# Patient Record
Sex: MALE | Race: WHITE | NOT HISPANIC OR LATINO | URBAN - METROPOLITAN AREA
[De-identification: names, ages, dates, MRNs, and addresses within clinical notes are randomized per-mention and may not be internally consistent; named-entity substitution may affect disease eponyms.]

---

## 2024-01-15 ENCOUNTER — LAB REQUISITION (OUTPATIENT)
Dept: LAB | Facility: HOSPITAL | Age: 84
End: 2024-01-15
Payer: MEDICARE

## 2024-01-16 LAB
LABORATORY COMMENT REPORT: NORMAL
PATH REPORT.GROSS SPEC: NORMAL
PATH REPORT.TOTAL CANCER: NORMAL

## 2024-12-16 ENCOUNTER — HOSPITAL ENCOUNTER (OUTPATIENT)
Dept: RADIOLOGY | Facility: EXTERNAL LOCATION | Age: 84
Discharge: HOME | End: 2024-12-16

## 2024-12-16 ENCOUNTER — APPOINTMENT (OUTPATIENT)
Dept: SURGERY | Facility: CLINIC | Age: 84
End: 2024-12-16
Payer: MEDICARE

## 2024-12-16 VITALS
SYSTOLIC BLOOD PRESSURE: 124 MMHG | TEMPERATURE: 97.3 F | OXYGEN SATURATION: 94 % | DIASTOLIC BLOOD PRESSURE: 62 MMHG | HEART RATE: 83 BPM

## 2024-12-16 DIAGNOSIS — R10.9 ABDOMINAL PAIN, UNSPECIFIED ABDOMINAL LOCATION: ICD-10-CM

## 2024-12-16 DIAGNOSIS — R10.9 ABDOMINAL PAIN, UNSPECIFIED ABDOMINAL LOCATION: Primary | ICD-10-CM

## 2024-12-16 DIAGNOSIS — R10.84 GENERALIZED ABDOMINAL PAIN: Primary | ICD-10-CM

## 2024-12-16 DIAGNOSIS — K92.2 GASTROINTESTINAL HEMORRHAGE, UNSPECIFIED GASTROINTESTINAL HEMORRHAGE TYPE: ICD-10-CM

## 2024-12-16 PROCEDURE — 1159F MED LIST DOCD IN RCRD: CPT | Performed by: SURGERY

## 2024-12-16 PROCEDURE — 1125F AMNT PAIN NOTED PAIN PRSNT: CPT | Performed by: SURGERY

## 2024-12-16 PROCEDURE — 1036F TOBACCO NON-USER: CPT | Performed by: SURGERY

## 2024-12-16 PROCEDURE — 99215 OFFICE O/P EST HI 40 MIN: CPT | Performed by: SURGERY

## 2024-12-16 PROCEDURE — 1160F RVW MEDS BY RX/DR IN RCRD: CPT | Performed by: SURGERY

## 2024-12-16 RX ORDER — PRAVASTATIN SODIUM 10 MG/1
TABLET ORAL
COMMUNITY
Start: 2015-09-14

## 2024-12-16 RX ORDER — DEXTROMETHORPHAN HYDROBROMIDE, GUAIFENESIN 5; 100 MG/5ML; MG/5ML
650 LIQUID ORAL EVERY 8 HOURS PRN
COMMUNITY

## 2024-12-16 RX ORDER — AMLODIPINE BESYLATE 2.5 MG/1
2.5 TABLET ORAL DAILY
COMMUNITY

## 2024-12-16 RX ORDER — GABAPENTIN 300 MG/1
600 CAPSULE ORAL 3 TIMES DAILY
COMMUNITY

## 2024-12-16 RX ORDER — FERROUS SULFATE 325(65) MG
325 TABLET ORAL
COMMUNITY
Start: 2024-01-10

## 2024-12-16 RX ORDER — METFORMIN HYDROCHLORIDE 500 MG/1
TABLET, EXTENDED RELEASE ORAL
COMMUNITY
Start: 2023-11-01

## 2024-12-16 RX ORDER — SODIUM CHLORIDE FOR INHALATION 3 %
VIAL, NEBULIZER (ML) INHALATION
COMMUNITY

## 2024-12-16 RX ORDER — IPRATROPIUM BROMIDE AND ALBUTEROL SULFATE 2.5; .5 MG/3ML; MG/3ML
SOLUTION RESPIRATORY (INHALATION)
COMMUNITY

## 2024-12-16 RX ORDER — TIZANIDINE 4 MG/1
4 TABLET ORAL 3 TIMES DAILY PRN
COMMUNITY

## 2024-12-16 RX ORDER — OMEPRAZOLE 20 MG/1
CAPSULE, DELAYED RELEASE ORAL
COMMUNITY
Start: 2012-12-07

## 2024-12-16 RX ORDER — ACETAMINOPHEN 500 MG
TABLET ORAL
COMMUNITY
Start: 2015-09-14

## 2024-12-16 ASSESSMENT — PAIN SCALES - GENERAL: PAINLEVEL_OUTOF10: 6

## 2024-12-16 NOTE — PROGRESS NOTES
Subjective   Patient ID: Erick Turpin is a 84 y.o. male who presents for Rectal Bleeding (Black tarry loose stool x about 3 wks).  Diffuse abdominal pain, abdominal bloating, fecal incontinence, bowel habit changes    HPI as described above.  Patient had a previous history of peptic ulcer disease with last EGD year ago.  At that time there was improvement.  Patient with a history of lung cancer, subsequent lung aspergillosis.  Review of Systems respiratory consistent with shortness of breath, GI consistent with the black, tarry stools.  Fecal incontinence.  Review of all other 10 system is negative.  Physical Exam pupils equal bilaterally, oral mucosa moist, the patient is not home oxygen.  There is decreased breath sounds on auscultation on the right side.  Changes after previous partial pulmonary team in the right.  Regular rhythm and rate, no murmurs, abdomen soft, diffusely distended, mild diffuse tenderness.  No rebound, no guarding.  Scar from previous surgeries.  Palpable peripheral pulses.  No focal neurological or motor deficits.    Objective I reviewed all available data including lab results, radiological studies, previous reports and notes.  CT scan of the chest showed no evidence of recurrence of the cancer, atelectasis on the right side.  EGD a year ago showed evidence of gastric ulcers.  Colonoscopy 5 years ago consistent with a partial colectomy, colon polyps.  CT scan 4 years ago showed no evidence of acute pathology, postsurgical changes    No diagnosis found.   There is no problem list on file for this patient.     No Known Allergies   Medication Documentation Review Audit       Reviewed by Star Dallas MD (Physician) on 12/16/24 at 1125      Medication Order Taking? Sig Documenting Provider Last Dose Status   acetaminophen (Tylenol 8 HOUR) 650 mg ER tablet [id number]  Take 1 tablet (650 mg) by mouth every 8 hours if needed for mild pain (1 - 3). Do not crush, chew, or split. Historical  Provider, MD  Active   amLODIPine (Norvasc) 2.5 mg tablet [id number]  Take 1 tablet (2.5 mg) by mouth once daily. for high blood pressure Historical Provider, MD  Active   cholecalciferol (Vitamin D-3) 50 mcg (2,000 unit) capsule [id number] Yes Take by mouth. Historical Provider, MD  Active   ferrous sulfate, 325 mg ferrous sulfate, tablet [id number] Yes 1 tablet (325 mg). Historical Provider, MD  Active   fluticasone-umeclidin-vilanter (TRELEGY-ELLIPTA) 100-62.5-25 mcg blister with device [id number] Yes 1 puffs, Inhalation, DAILY, at the same time every day, 60 EA, Date: 4/16/24 3:55:00 PM EDT, [id number], Powder, 1 puffs Inhalation DAILY,Instr:at the same time every day, 167, 04/12/2024 10:03:00 EDT, Height/Length Dosing, cm, 89.8, 04/12/2024 10:03:00 EDT, Weight Dosing, kg Historical Provider, MD  Active   gabapentin (Neurontin) 300 mg capsule [id number]  Take 2 capsules (600 mg) by mouth 3 times a day. Historical Provider, MD  Active   ipratropium-albuteroL (Duo-Neb) 0.5-2.5 mg/3 mL nebulizer solution [id number]  USE 3 ML VIA NEBULIZER EVERY 6 HOURS AS NEEDED Historical Provider, MD  Active   metFORMIN  mg 24 hr tablet [id number] Yes  Historical Provider, MD  Active   multivit with minerals/lutein (MULTIVITAMIN 50 PLUS ORAL) [id number] Yes Take by mouth. Historical Provider, MD  Active   omeprazole (PriLOSEC) 20 mg DR capsule [id number] Yes Take by mouth. Historical Provider, MD  Active   pravastatin (Pravachol) 10 mg tablet [id number] Yes Take by mouth. Historical Provider, MD  Active   sodium chloride 3 % nebulizer solution [id number]  USE 1 VIAL VIA NEBULIZER TWICE DAILY Historical Provider, MD  Active   tiZANidine (Zanaflex) 4 mg tablet [id number]  Take 1 tablet (4 mg) by mouth 3 times a day as needed. Historical Provider, MD  Active                    Past Medical History:   Diagnosis Date    Malignant neoplasm of unspecified part of unspecified bronchus or lung (Multi) 05/26/2021    Non-small  cell lung cancer    Pain in unspecified joint     Joint pain    Personal history of other diseases of the circulatory system     History of hypertension    Personal history of other diseases of the nervous system and sense organs     History of deafness    Personal history of other specified conditions     History of heartburn    Prediabetes     Prediabetes     Social History     Tobacco Use   Smoking Status Former    Types: Cigarettes   Smokeless Tobacco Never     No family history on file.   Past Surgical History:   Procedure Laterality Date    COLON SURGERY  01/11/2021    Colon Surgery    COLONOSCOPY  01/11/2021    Complete Colonoscopy    LUNG LOBECTOMY  05/26/2021    Lung Lobectomy    PROSTATE SURGERY  01/11/2021    Prostate Surgery       Assessment/Plan   With fecal incontinence, black tarry stools, suspicion for GI bleeding.  Hemodynamically patient remained stable, therefore we will proceed with the scheduling of EGD and colonoscopy on elective basis.  Patient had a history of lung cancer, aspergillosis, high risk for development of recurrence of the stress ulcers.  Also we will proceed with a CT scan of the abdomen for unexplained chronic abdominal pain.  Risks, benefits, alternative treatment were explained to the patient.  All questions were answered.  Informed consent was obtained.      Star Dallas MD

## 2024-12-17 ENCOUNTER — HOSPITAL ENCOUNTER (OUTPATIENT)
Dept: RADIOLOGY | Facility: EXTERNAL LOCATION | Age: 84
Discharge: HOME | End: 2024-12-17

## 2024-12-17 DIAGNOSIS — G89.29 CHRONIC ABDOMINAL PAIN: Primary | ICD-10-CM

## 2024-12-17 DIAGNOSIS — R10.9 CHRONIC ABDOMINAL PAIN: ICD-10-CM

## 2024-12-17 DIAGNOSIS — R10.9 CHRONIC ABDOMINAL PAIN: Primary | ICD-10-CM

## 2024-12-17 DIAGNOSIS — G89.29 CHRONIC ABDOMINAL PAIN: ICD-10-CM

## 2024-12-17 LAB
NON-UH HIE CREATININE: 1.1 MG/DL (ref 0.6–1.1)
NON-UH HIE GFR AA: >60
NON-UH HIE GLOMERULAR FILTRATION RATE: >60 ML/MIN/1.73M?

## 2025-01-16 ENCOUNTER — APPOINTMENT (OUTPATIENT)
Dept: SURGERY | Facility: CLINIC | Age: 85
End: 2025-01-16
Payer: MEDICARE